# Patient Record
Sex: MALE | Race: WHITE | NOT HISPANIC OR LATINO | URBAN - METROPOLITAN AREA
[De-identification: names, ages, dates, MRNs, and addresses within clinical notes are randomized per-mention and may not be internally consistent; named-entity substitution may affect disease eponyms.]

---
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
## 2022-07-09 ENCOUNTER — OFFICE VISIT (OUTPATIENT)
Dept: FAMILY MEDICINE CLINIC | Facility: CLINIC | Age: 22
End: 2022-07-09
Payer: COMMERCIAL

## 2022-07-09 VITALS
DIASTOLIC BLOOD PRESSURE: 60 MMHG | TEMPERATURE: 99 F | HEART RATE: 108 BPM | SYSTOLIC BLOOD PRESSURE: 120 MMHG | WEIGHT: 138.19 LBS | RESPIRATION RATE: 16 BRPM | OXYGEN SATURATION: 98 %

## 2022-07-09 DIAGNOSIS — H10.32 ACUTE BACTERIAL CONJUNCTIVITIS OF LEFT EYE: Primary | ICD-10-CM

## 2022-07-09 DIAGNOSIS — Z97.3 USES CONTACT LENSES: ICD-10-CM

## 2022-07-09 PROCEDURE — 3074F SYST BP LT 130 MM HG: CPT | Performed by: PHYSICIAN ASSISTANT

## 2022-07-09 PROCEDURE — 99213 OFFICE O/P EST LOW 20 MIN: CPT | Performed by: PHYSICIAN ASSISTANT

## 2022-07-09 PROCEDURE — 3078F DIAST BP <80 MM HG: CPT | Performed by: PHYSICIAN ASSISTANT

## 2022-07-09 RX ORDER — CIPROFLOXACIN HYDROCHLORIDE 3.5 MG/ML
SOLUTION/ DROPS TOPICAL
Qty: 5 ML | Refills: 1 | Status: SHIPPED | OUTPATIENT
Start: 2022-07-09 | End: 2022-07-16

## 2022-07-09 RX ORDER — DEXTROAMPHETAMINE SACCHARATE, AMPHETAMINE ASPARTATE, DEXTROAMPHETAMINE SULFATE AND AMPHETAMINE SULFATE 2.5; 2.5; 2.5; 2.5 MG/1; MG/1; MG/1; MG/1
1 TABLET ORAL DAILY PRN
COMMUNITY
Start: 2022-05-12

## 2024-07-15 ENCOUNTER — OFFICE VISIT (OUTPATIENT)
Dept: FAMILY MEDICINE CLINIC | Facility: CLINIC | Age: 24
End: 2024-07-15
Payer: COMMERCIAL

## 2024-07-15 VITALS
RESPIRATION RATE: 18 BRPM | SYSTOLIC BLOOD PRESSURE: 126 MMHG | TEMPERATURE: 98 F | DIASTOLIC BLOOD PRESSURE: 73 MMHG | WEIGHT: 150 LBS | BODY MASS INDEX: 22.22 KG/M2 | OXYGEN SATURATION: 98 % | HEART RATE: 83 BPM | HEIGHT: 69 IN

## 2024-07-15 DIAGNOSIS — Z72.51 HIGH RISK HETEROSEXUAL BEHAVIOR: ICD-10-CM

## 2024-07-15 DIAGNOSIS — N34.2 URETHRITIS: ICD-10-CM

## 2024-07-15 DIAGNOSIS — R39.9 UTI SYMPTOMS: Primary | ICD-10-CM

## 2024-07-15 DIAGNOSIS — R30.0 DYSURIA: ICD-10-CM

## 2024-07-15 LAB
APPEARANCE: CLEAR
BILIRUBIN: NEGATIVE
GLUCOSE (URINE DIPSTICK): NEGATIVE MG/DL
KETONES (URINE DIPSTICK): NEGATIVE MG/DL
LEUKOCYTES: NEGATIVE
MULTISTIX LOT#: NORMAL NUMERIC
NITRITE, URINE: NEGATIVE
OCCULT BLOOD: NEGATIVE
PH, URINE: 7.5 (ref 4.5–8)
PROTEIN (URINE DIPSTICK): NEGATIVE MG/DL
SPECIFIC GRAVITY: 1.02 (ref 1–1.03)
URINE-COLOR: YELLOW
UROBILINOGEN,SEMI-QN: 0.2 MG/DL (ref 0–1.9)

## 2024-07-15 PROCEDURE — 87491 CHLMYD TRACH DNA AMP PROBE: CPT | Performed by: PHYSICIAN ASSISTANT

## 2024-07-15 PROCEDURE — 87086 URINE CULTURE/COLONY COUNT: CPT | Performed by: PHYSICIAN ASSISTANT

## 2024-07-15 PROCEDURE — 87591 N.GONORRHOEAE DNA AMP PROB: CPT | Performed by: PHYSICIAN ASSISTANT

## 2024-07-15 RX ORDER — DOXYCYCLINE HYCLATE 100 MG/1
100 CAPSULE ORAL 2 TIMES DAILY
Qty: 14 CAPSULE | Refills: 0 | Status: SHIPPED | OUTPATIENT
Start: 2024-07-15 | End: 2024-07-22

## 2024-07-16 NOTE — PROGRESS NOTES
CHIEF COMPLAINT:     Chief Complaint   Patient presents with    UTI     Started 4 days ago        HPI:   Fantasma Saucedo is a 24 year old male who presents with symptoms of UTI. Patient reporting symptoms of dysuria x 4 days, at times urethral burning post micturition.  Denies fever, no abd pain, no testicular pain/masses, no noted discharge, no lesions.   No prior h/o STI, however new female partner with unprotected intercourse.   No other concerns.     Current Outpatient Medications   Medication Sig Dispense Refill    doxycycline 100 MG Oral Cap Take 1 capsule (100 mg total) by mouth 2 (two) times daily for 7 days. 14 capsule 0    amphetamine-dextroamphetamine 10 MG Oral Tab Take 1 tablet by mouth daily as needed.        No past medical history on file.   Social History:  Social History     Socioeconomic History    Marital status: Unknown   Tobacco Use    Smoking status: Never    Smokeless tobacco: Never     Social Determinants of Health     Food Insecurity: Low Risk  (6/27/2024)    Received from [facility]    Food Insecurity     Have there been times that your food ran out, and you didn't have money to get more?: No     Are there times that you worry that this might happen?: No   Transportation Needs: Low Risk  (6/27/2024)    Received from [facility]    Transportation Needs     Do you have trouble getting transportation to medical appointments?: No     How do you normally get to and from your appointments?: Other         REVIEW OF SYSTEMS:   GENERAL: See above  GI: See HPI.    : See HPI.      EXAM:   /73   Pulse 83   Temp 97.7 °F (36.5 °C)   Resp 18   Ht 5' 9\" (1.753 m)   Wt 150 lb (68 kg)   SpO2 98%   BMI 22.15 kg/m²   GENERAL: well developed, well nourished,in no apparent distress  CARDIO: RRR, no murmurs  LUNGS: clear to ausculation bilaterally, no wheezing or rhonchi  GI: BS present x 4.  No hepatosplenomegaly.  : no suprapubic tenderness. No bladder  distention, or CVAT     Recent Results (from the past 24 hour(s))   URINALYSIS, AUTO, W/O SCOPE    Collection Time: 07/15/24  7:32 PM   Result Value Ref Range    Glucose Urine Negative Negative mg/dL    Bilirubin Urine Negative Negative    Ketones, UA Negative Negative - Trace mg/dL    Spec Gravity 1.020 1.005 - 1.030    Blood Urine Negative Negative    PH Urine 7.5 5.0 - 8.0    Protein Urine Negative Negative - Trace mg/dL    Urobilinogen Urine 0.2 0.2 - 1.0 mg/dL    Nitrite Urine Negative Negative    Leukocyte Esterase Urine Negative Negative    APPEARANCE clear Clear    Color Urine yellow Yellow    Multistix Lot# [id number] Numeric    Multistix Expiration Date 11/30/2024 Date         ASSESSMENT AND PLAN:   Fantasma Saucedo is a 24 year old male presents with urinary symptoms.    ASSESSMENT:  Encounter Diagnoses   Name Primary?    UTI symptoms Yes    Dysuria     Urethritis     High risk heterosexual behavior        PLAN:    Urine dip negative, urine cx and GC/Chlamydia negative.  WIll cover with doxycycline while awaiting results, advised abstain from sexually activity until labs known, tx completed, and asymptomatic.  STI prevention reviewed in detail.  Discussed in event (+) gonorrhea will need to return for ceftriaxone IM.   Pt v/u.     Meds & Refills for this Visit:  Requested Prescriptions     Signed Prescriptions Disp Refills    doxycycline 100 MG Oral Cap 14 capsule 0     Sig: Take 1 capsule (100 mg total) by mouth 2 (two) times daily for 7 days.       Risk and benefits of medication discussed.   Stressed importance of completing full course of antibiotic unless told otherwise.     The patient indicates understanding of these issues and agrees to the plan.  The patient is asked to see PCP in 3 days if not better. Seek care immediately for new onset of fever, vomiting, worsening symptoms.      Mell Jones PA-C

## 2024-07-17 LAB
C TRACH DNA SPEC QL NAA+PROBE: POSITIVE
N GONORRHOEA DNA SPEC QL NAA+PROBE: NEGATIVE

## 2024-07-29 ENCOUNTER — OFFICE VISIT (OUTPATIENT)
Dept: FAMILY MEDICINE CLINIC | Facility: CLINIC | Age: 24
End: 2024-07-29
Payer: COMMERCIAL

## 2024-07-29 VITALS
RESPIRATION RATE: 18 BRPM | BODY MASS INDEX: 22 KG/M2 | OXYGEN SATURATION: 98 % | DIASTOLIC BLOOD PRESSURE: 80 MMHG | WEIGHT: 150 LBS | SYSTOLIC BLOOD PRESSURE: 116 MMHG | TEMPERATURE: 97 F | HEART RATE: 75 BPM

## 2024-07-29 DIAGNOSIS — R39.9 UTI SYMPTOMS: Primary | ICD-10-CM

## 2024-07-29 LAB
APPEARANCE: CLEAR
BILIRUBIN: NEGATIVE
GLUCOSE (URINE DIPSTICK): NEGATIVE MG/DL
KETONES (URINE DIPSTICK): NEGATIVE MG/DL
LEUKOCYTES: NEGATIVE
MULTISTIX LOT#: NORMAL NUMERIC
NITRITE, URINE: NEGATIVE
OCCULT BLOOD: NEGATIVE
PH, URINE: 7 (ref 4.5–8)
PROTEIN (URINE DIPSTICK): NEGATIVE MG/DL
SPECIFIC GRAVITY: 1.02 (ref 1–1.03)
URINE-COLOR: YELLOW
UROBILINOGEN,SEMI-QN: 1 MG/DL (ref 0–1.9)

## 2024-07-29 PROCEDURE — 87591 N.GONORRHOEAE DNA AMP PROB: CPT | Performed by: NURSE PRACTITIONER

## 2024-07-29 PROCEDURE — 87491 CHLMYD TRACH DNA AMP PROBE: CPT | Performed by: NURSE PRACTITIONER

## 2024-07-29 PROCEDURE — 87086 URINE CULTURE/COLONY COUNT: CPT | Performed by: NURSE PRACTITIONER

## 2024-07-29 RX ORDER — DOXYCYCLINE HYCLATE 100 MG
100 TABLET ORAL 2 TIMES DAILY
Qty: 14 TABLET | Refills: 0 | Status: SHIPPED | OUTPATIENT
Start: 2024-07-29 | End: 2024-08-05

## 2024-07-29 NOTE — PROGRESS NOTES
CHIEF COMPLAINT:     Chief Complaint   Patient presents with    STD       HPI:   Fantasma Saucedo is a 24 year old male who presents with concerns of a possible std.. Pt reports mild dysuria. He was seen on 7/15/24 where he tested positive for chlamydia and was treated with doxycycline. Pt notes symptoms improved but then over a couple days was drinking alcohol and missed a couple doses. He notes dysuria still present but much milder than before. Denies any sexual activity since visit on 7/15. He denies urinary frequency, urgency.  Denies flank pain, fever, hematuria, nausea, or vomiting ,abdominal pain, or scrotal/testicular pain. Denies any penile discharge or lesions. Tolerates PO well at home. No n/v/d.  Denies any other aggravating or relieving factors at home. Denies any other treatment attempts prior to arrival.       Current Outpatient Medications   Medication Sig Dispense Refill    Doxycycline Hyclate 100 MG Oral Tab Take 1 tablet (100 mg total) by mouth 2 (two) times daily for 7 days. 14 tablet 0    amphetamine-dextroamphetamine 10 MG Oral Tab Take 1 tablet by mouth daily as needed.        History reviewed. No pertinent past medical history.   Social History:  Social History     Socioeconomic History    Marital status: Unknown   Tobacco Use    Smoking status: Never    Smokeless tobacco: Never     Social Determinants of Health     Food Insecurity: Low Risk  (6/27/2024)    Received from [facility]    Food Insecurity     Have there been times that your food ran out, and you didn't have money to get more?: No     Are there times that you worry that this might happen?: No   Transportation Needs: Low Risk  (6/27/2024)    Received from [facility]    Transportation Needs     Do you have trouble getting transportation to medical appointments?: No     How do you normally get to and from your appointments?: Other         REVIEW OF SYSTEMS:   GENERAL: Denies fever, chills, or  body aches  SKIN:  Denies  other rashes. Denies any skin wounds.  GI: Denies abdominal pain. No N/V/D.   : + mild dysuria Denies urinary frequency, urgency, any penile discharge,  hematuria, flank pain,or any scrotal/testicular pain or swelling.  NEURO: Denies headaches.    EXAM:   /80   Pulse 75   Temp 96.9 °F (36.1 °C)   Resp 18   Wt 150 lb (68 kg)   SpO2 98%   BMI 22.15 kg/m²     GENERAL: well developed, well nourished,in no apparent distress  CARDIO: RRR, no murmurs  LUNGS: clear to ausculation bilaterally, no wheezing or rhonchi  GI: No abdominal distention. No tenderness or guarding with palpation.  : No bladder distention, or CVAT. Pt declined any further  exam.  Lymphadenopathy: No inguinal adenopathy present bilaterally    Recent Results (from the past 24 hour(s))   URINALYSIS, AUTO, W/O SCOPE    Collection Time: 07/29/24  2:31 PM   Result Value Ref Range    Glucose Urine Negative Negative mg/dL    Bilirubin Urine Negative Negative    Ketones, UA Negative Negative - Trace mg/dL    Spec Gravity 1.020 1.005 - 1.030    Blood Urine Negative Negative    PH Urine 7.0 5.0 - 8.0    Protein Urine Negative Negative - Trace mg/dL    Urobilinogen Urine 1.0 0.2 - 1.0 mg/dL    Nitrite Urine Negative Negative    Leukocyte Esterase Urine Negative Negative    APPEARANCE clear Clear    Color Urine yellow Yellow    Multistix Lot# [id number] Numeric    Multistix Expiration Date 11/30/2024 Date         ASSESSMENT AND PLAN:       ICD-10-CM    1. UTI symptoms  R39.9 Urine Culture, Routine     URINALYSIS, AUTO, W/O SCOPE     Chlamydia/Gc Amplification     Urine Culture, Routine     Chlamydia/Gc Amplification     Doxycycline Hyclate 100 MG Oral Tab        Urine dip: wnl  Urine culture sent to lab.  Urine GC/Chlamydia sent to lab.         Discussed HPI, urine dip and, and physical exam with pt. We discussed uti vs std's or other etiologies.  Treatment options discussed and explained in detail. He would like to  empirically treat for chlamydia again today with doxycycline. He declined any empiric treatment for uti or gonorrhea. today. The risks, benefits and potential side effects of the medications were reviewed. Alternatives were discussed. I advised follow up with his PCP or [facility] for follow up and further std testing that is not available here. Monitoring parameters and expected course outlined. We discussed signs and symptoms that should prompt him to go to the emergency department immediately for evaluation including any fevers, flank pain, abdominal pain, scrotal pain, swelling, penile pain, or if he has any concerns. Patient to go to emergency department if symptoms fail to respond as outlined, or worsen in any way. He agreed with the plan.    Patient Instructions   1. Rest. Drink plenty of fluids.  2. Doxycycline as prescribed.  3. We will send urine testing to lab and call you with results in 2-3days.   4. Follow up with PMD in 4-5 days for reeval. Go to the emergency department immediately if symptoms worsen, change, you develop fevers, flank pain, vomiting, pelvic pain, scrotal pain/swelling, penile discharge, or if you have any concerns.  5. Follow up with PMD or [facility] for further std testing that is not available here in the walk-in clinic. Avoid any sexual activity until all results are final, treatments are completed, and symptoms have resolved.

## 2024-07-29 NOTE — PATIENT INSTRUCTIONS
1. Rest. Drink plenty of fluids.  2. Doxycycline as prescribed.  3. We will send urine testing to lab and call you with results in 2-3days.   4. Follow up with PMD in 4-5 days for reeval. Go to the emergency department immediately if symptoms worsen, change, you develop fevers, flank pain, vomiting, pelvic pain, scrotal pain/swelling, penile discharge, or if you have any concerns.  5. Follow up with PMD or [facility] for further std testing that is not available here in the walk-in clinic. Avoid any sexual activity until all results are final, treatments are completed, and symptoms have resolved.

## 2024-07-30 LAB
C TRACH DNA SPEC QL NAA+PROBE: POSITIVE
N GONORRHOEA DNA SPEC QL NAA+PROBE: NEGATIVE

## 2024-12-30 ENCOUNTER — OFFICE VISIT (OUTPATIENT)
Dept: FAMILY MEDICINE CLINIC | Facility: CLINIC | Age: 24
End: 2024-12-30
Payer: COMMERCIAL

## 2024-12-30 VITALS
DIASTOLIC BLOOD PRESSURE: 78 MMHG | WEIGHT: 141 LBS | BODY MASS INDEX: 20.88 KG/M2 | SYSTOLIC BLOOD PRESSURE: 130 MMHG | TEMPERATURE: 99 F | OXYGEN SATURATION: 100 % | HEIGHT: 69 IN | RESPIRATION RATE: 16 BRPM | HEART RATE: 100 BPM

## 2024-12-30 DIAGNOSIS — Z11.3 SCREEN FOR STD (SEXUALLY TRANSMITTED DISEASE): ICD-10-CM

## 2024-12-30 DIAGNOSIS — R39.9 URINARY SYMPTOM OR SIGN: Primary | ICD-10-CM

## 2024-12-30 LAB
APPEARANCE: CLEAR
BILIRUBIN: NEGATIVE
GLUCOSE (URINE DIPSTICK): NEGATIVE MG/DL
KETONES (URINE DIPSTICK): NEGATIVE MG/DL
LEUKOCYTES: NEGATIVE
MULTISTIX LOT#: NORMAL NUMERIC
NITRITE, URINE: NEGATIVE
OCCULT BLOOD: NEGATIVE
PH, URINE: 6 (ref 4.5–8)
PROTEIN (URINE DIPSTICK): NEGATIVE MG/DL
SPECIFIC GRAVITY: 1.01 (ref 1–1.03)
URINE-COLOR: YELLOW
UROBILINOGEN,SEMI-QN: 0.2 MG/DL (ref 0–1.9)

## 2024-12-30 PROCEDURE — 87491 CHLMYD TRACH DNA AMP PROBE: CPT | Performed by: NURSE PRACTITIONER

## 2024-12-30 PROCEDURE — 87086 URINE CULTURE/COLONY COUNT: CPT | Performed by: NURSE PRACTITIONER

## 2024-12-30 PROCEDURE — 87591 N.GONORRHOEAE DNA AMP PROB: CPT | Performed by: NURSE PRACTITIONER

## 2024-12-30 RX ORDER — DOXYCYCLINE HYCLATE 100 MG
100 TABLET ORAL 2 TIMES DAILY
Qty: 14 TABLET | Refills: 0 | Status: SHIPPED | OUTPATIENT
Start: 2024-12-30 | End: 2025-01-06

## 2024-12-31 DIAGNOSIS — R39.9 URINARY SYMPTOM OR SIGN: Primary | ICD-10-CM

## 2024-12-31 LAB
C TRACH DNA SPEC QL NAA+PROBE: NEGATIVE
N GONORRHOEA DNA SPEC QL NAA+PROBE: NEGATIVE

## 2024-12-31 RX ORDER — SULFAMETHOXAZOLE AND TRIMETHOPRIM 800; 160 MG/1; MG/1
1 TABLET ORAL 2 TIMES DAILY
Qty: 14 TABLET | Refills: 0 | Status: SHIPPED | OUTPATIENT
Start: 2024-12-31 | End: 2025-01-07

## 2024-12-31 NOTE — PROGRESS NOTES
CHIEF COMPLAINT:     Chief Complaint   Patient presents with    UTI     2-3 days, burning  OTC none       HPI:   Fantasma Saucedo is a 24 year old male who presents with concerns of a possible std.. Pt reports dysuria and this feels like past bouts of chlamydia. He notes one female sexual partner but he is concerned she may have had other partners while they were on a break.  He denies urinary frequency, urgency.  Denies flank pain, fever, hematuria, nausea, or vomiting ,abdominal pain, or scrotal/testicular pain. Denies any penile discharge or lesions. Tolerates PO well at home. No n/v/d.  Denies any other aggravating or relieving factors at home. Denies any other treatment attempts prior to arrival.        Current Outpatient Medications   Medication Sig Dispense Refill    Doxycycline Hyclate 100 MG Oral Tab Take 1 tablet (100 mg total) by mouth 2 (two) times daily for 7 days. 14 tablet 0    amphetamine-dextroamphetamine 10 MG Oral Tab Take 1 tablet by mouth daily as needed.        No past medical history on file.   Social History:  Social History     Socioeconomic History    Marital status: Unknown   Tobacco Use    Smoking status: Never    Smokeless tobacco: Never     Social Drivers of Health     Food Insecurity: Low Risk  (6/27/2024)    Received from [facility]    Food Insecurity     Have there been times that your food ran out, and you didn't have money to get more?: No     Are there times that you worry that this might happen?: No   Transportation Needs: Low Risk  (6/27/2024)    Received from [facility]    Transportation Needs     Do you have trouble getting transportation to medical appointments?: No     How do you normally get to and from your appointments?: Other         REVIEW OF SYSTEMS:   GENERAL: Denies fever, chills, or body aches  SKIN:  Denies  other rashes. Denies any skin wounds.  GI: Denies abdominal pain. No N/V/D.   : +dysuria Denies urinary frequency,  urgency, any penile discharge,  hematuria, flank pain,or any scrotal/testicular pain or swelling.  NEURO: Denies headaches.    EXAM:   /78   Pulse 100   Temp 98.6 °F (37 °C)   Resp 16   Ht 5' 9\" (1.753 m)   Wt 141 lb (64 kg)   SpO2 100%   BMI 20.82 kg/m²     GENERAL: well developed, well nourished,in no apparent distress  CARDIO: RRR, no murmurs  LUNGS: clear to ausculation bilaterally, no wheezing or rhonchi  GI: No abdominal distention. No tenderness or guarding with palpation.  : No bladder distention, or CVAT. Pt declined any further  exam.  Lymphadenopathy: No inguinal adenopathy present bilaterally    Recent Results (from the past 24 hours)   URINALYSIS, AUTO, W/O SCOPE    Collection Time: 12/30/24  6:01 PM   Result Value Ref Range    Glucose Urine Negative Negative mg/dL    Bilirubin Urine Negative Negative    Ketones, UA Negative Negative - Trace mg/dL    Spec Gravity 1.010 1.005 - 1.030    Blood Urine Negative Negative    PH Urine 6.0 5.0 - 8.0    Protein Urine Negative Negative - Trace mg/dL    Urobilinogen Urine 0.2 0.2 - 1.0 mg/dL    Nitrite Urine Negative Negative    Leukocyte Esterase Urine Negative Negative    APPEARANCE clear Clear    Color Urine yellow Yellow    Multistix Lot# [id number] Numeric    Multistix Expiration Date 5-31-25 Date         ASSESSMENT AND PLAN:       ICD-10-CM    1. Urinary symptom or sign  R39.9 URINALYSIS, AUTO, W/O SCOPE     Chlamydia/Gc Amplification     Urine Culture, Routine     Chlamydia/Gc Amplification     Urine Culture, Routine     Doxycycline Hyclate 100 MG Oral Tab      2. Screen for STD (sexually transmitted disease)  Z11.3         Urine dip: wnl  Urine culture sent to lab.  Urine GC/Chlamydia sent to lab.          Discussed HPI, urine dip and, and physical exam with pt. We discussed uti vs std's or other etiologies.  Treatment options discussed and explained in detail. He would like to empirically treat for chlamydia again today with doxycycline. He  declined any empiric treatment for uti or gonorrhea today. The risks, benefits and potential side effects of the medications were reviewed. Alternatives were discussed. I advised follow up with his PCP or [facility] for follow up and further std testing that is not available here. Monitoring parameters and expected course outlined. We discussed signs and symptoms that should prompt him to go to the emergency department immediately for evaluation including any fevers, flank pain, abdominal pain, scrotal pain, swelling, penile pain, or if he has any concerns. Patient to go to emergency department if symptoms fail to respond as outlined, or worsen in any way. He agreed with the plan.    Patient Instructions   1. Rest. Drink plenty of fluids.  2. Doxycycline as prescribed.  3. We will send urine testing to lab and call you with results in 2-3days.   4. Follow up with PMD in 4-5 days for reeval. Go to the emergency department immediately if symptoms worsen, change, you develop fevers, flank pain, vomiting, pelvic pain, scrotal pain/swelling, penile discharge, or if you have any concerns.  5. Follow up with PMD or [facility] for further std testing that is not available here in the walk-in clinic. Avoid any sexual activity until all results are final, treatments are completed, and symptoms have resolved.       The patient/parent indicates understanding of these issues and agrees to the plan.

## 2024-12-31 NOTE — PATIENT INSTRUCTIONS
1. Rest. Drink plenty of fluids.  2. Doxycycline as prescribed.  3. We will send urine testing to lab and call you with results in 2-3days.   4. Follow up with PMD in 4-5 days for reeval. Go to the emergency department immediately if symptoms worsen, change, you develop fevers, flank pain, vomiting, pelvic pain, scrotal pain/swelling, penile discharge, or if you have any concerns.  5. Follow up with PMD or [facility] for further std testing that is not available here in the walk-in clinic. Avoid any sexual activity until all results are final, treatments are completed, and symptoms have resolved.

## (undated) NOTE — LETTER
Date: 7/9/2022    Patient Name: Navdeep Genao          To Whom it may concern: This letter has been written at the patient's request. The above patient was seen at the [facility] for treatment of a medical condition. This patient should be excused from attending work/school from 07/09/22 through 07/14/22. The patient may return to work/school on 7/15/22.           Sincerely,    Tesha Frazier PA-C